# Patient Record
Sex: FEMALE | Race: WHITE | Employment: FULL TIME | URBAN - METROPOLITAN AREA
[De-identification: names, ages, dates, MRNs, and addresses within clinical notes are randomized per-mention and may not be internally consistent; named-entity substitution may affect disease eponyms.]

---

## 2021-01-16 ENCOUNTER — HOSPITAL ENCOUNTER (EMERGENCY)
Age: 21
Discharge: HOME | End: 2021-01-16
Attending: EMERGENCY MEDICINE
Payer: COMMERCIAL

## 2021-01-16 ENCOUNTER — APPOINTMENT (OUTPATIENT)
Dept: CT IMAGING | Age: 21
End: 2021-01-16
Payer: COMMERCIAL

## 2021-01-16 VITALS
TEMPERATURE: 98.1 F | HEIGHT: 65 IN | HEART RATE: 64 BPM | DIASTOLIC BLOOD PRESSURE: 62 MMHG | RESPIRATION RATE: 15 BRPM | OXYGEN SATURATION: 100 % | WEIGHT: 221.56 LBS | SYSTOLIC BLOOD PRESSURE: 106 MMHG | BODY MASS INDEX: 36.91 KG/M2

## 2021-01-16 DIAGNOSIS — K59.00 CONSTIPATION, UNSPECIFIED CONSTIPATION TYPE: Primary | ICD-10-CM

## 2021-01-16 DIAGNOSIS — R10.9 ABDOMINAL PAIN, UNSPECIFIED ABDOMINAL LOCATION: ICD-10-CM

## 2021-01-16 DIAGNOSIS — K62.5 RECTAL BLEEDING: ICD-10-CM

## 2021-01-16 LAB
A/G RATIO: 1.7 (ref 1.1–2.2)
ALBUMIN SERPL-MCNC: 4.5 G/DL (ref 3.4–5)
ALP BLD-CCNC: 57 U/L (ref 40–129)
ALT SERPL-CCNC: 33 U/L (ref 10–40)
ANION GAP SERPL CALCULATED.3IONS-SCNC: 11 MMOL/L (ref 3–16)
AST SERPL-CCNC: 25 U/L (ref 15–37)
BACTERIA: ABNORMAL /HPF
BASOPHILS ABSOLUTE: 0 K/UL (ref 0–0.2)
BASOPHILS RELATIVE PERCENT: 0.5 %
BILIRUB SERPL-MCNC: 0.4 MG/DL (ref 0–1)
BILIRUBIN URINE: NEGATIVE
BLOOD, URINE: ABNORMAL
BUN BLDV-MCNC: 10 MG/DL (ref 7–20)
CALCIUM SERPL-MCNC: 9.6 MG/DL (ref 8.3–10.6)
CHLORIDE BLD-SCNC: 101 MMOL/L (ref 99–110)
CLARITY: CLEAR
CO2: 24 MMOL/L (ref 21–32)
COLOR: YELLOW
CREAT SERPL-MCNC: 0.7 MG/DL (ref 0.6–1.1)
EOSINOPHILS ABSOLUTE: 0.1 K/UL (ref 0–0.6)
EOSINOPHILS RELATIVE PERCENT: 1.2 %
EPITHELIAL CELLS, UA: 1 /HPF (ref 0–5)
GFR AFRICAN AMERICAN: >60
GFR NON-AFRICAN AMERICAN: >60
GLOBULIN: 2.7 G/DL
GLUCOSE BLD-MCNC: 80 MG/DL (ref 70–99)
GLUCOSE URINE: NEGATIVE MG/DL
HCG(URINE) PREGNANCY TEST: NEGATIVE
HCT VFR BLD CALC: 35.9 % (ref 36–48)
HEMOGLOBIN: 11.9 G/DL (ref 12–16)
HYALINE CASTS: 1 /LPF (ref 0–8)
KETONES, URINE: NEGATIVE MG/DL
LEUKOCYTE ESTERASE, URINE: NEGATIVE
LYMPHOCYTES ABSOLUTE: 3.2 K/UL (ref 1–5.1)
LYMPHOCYTES RELATIVE PERCENT: 33.1 %
MCH RBC QN AUTO: 30.9 PG (ref 26–34)
MCHC RBC AUTO-ENTMCNC: 33 G/DL (ref 31–36)
MCV RBC AUTO: 93.5 FL (ref 80–100)
MICROSCOPIC EXAMINATION: YES
MONOCYTES ABSOLUTE: 0.6 K/UL (ref 0–1.3)
MONOCYTES RELATIVE PERCENT: 5.8 %
NEUTROPHILS ABSOLUTE: 5.7 K/UL (ref 1.7–7.7)
NEUTROPHILS RELATIVE PERCENT: 59.4 %
NITRITE, URINE: NEGATIVE
PDW BLD-RTO: 13 % (ref 12.4–15.4)
PH UA: 6.5 (ref 5–8)
PLATELET # BLD: 343 K/UL (ref 135–450)
PMV BLD AUTO: 8.3 FL (ref 5–10.5)
POTASSIUM SERPL-SCNC: 3.7 MMOL/L (ref 3.5–5.1)
PROTEIN UA: NEGATIVE MG/DL
RBC # BLD: 3.84 M/UL (ref 4–5.2)
RBC UA: 2 /HPF (ref 0–4)
SODIUM BLD-SCNC: 136 MMOL/L (ref 136–145)
SPECIFIC GRAVITY UA: 1.01 (ref 1–1.03)
TOTAL PROTEIN: 7.2 G/DL (ref 6.4–8.2)
URINE REFLEX TO CULTURE: ABNORMAL
URINE TYPE: ABNORMAL
UROBILINOGEN, URINE: 0.2 E.U./DL
WBC # BLD: 9.6 K/UL (ref 4–11)
WBC UA: 1 /HPF (ref 0–5)

## 2021-01-16 PROCEDURE — 84703 CHORIONIC GONADOTROPIN ASSAY: CPT

## 2021-01-16 PROCEDURE — 80053 COMPREHEN METABOLIC PANEL: CPT

## 2021-01-16 PROCEDURE — 99285 EMERGENCY DEPT VISIT HI MDM: CPT

## 2021-01-16 PROCEDURE — 6360000004 HC RX CONTRAST MEDICATION: Performed by: EMERGENCY MEDICINE

## 2021-01-16 PROCEDURE — 85025 COMPLETE CBC W/AUTO DIFF WBC: CPT

## 2021-01-16 PROCEDURE — 81001 URINALYSIS AUTO W/SCOPE: CPT

## 2021-01-16 PROCEDURE — 74177 CT ABD & PELVIS W/CONTRAST: CPT

## 2021-01-16 RX ORDER — DOCUSATE SODIUM 100 MG/1
100 CAPSULE, LIQUID FILLED ORAL 2 TIMES DAILY PRN
Qty: 20 CAPSULE | Refills: 0 | Status: SHIPPED | OUTPATIENT
Start: 2021-01-16

## 2021-01-16 RX ADMIN — IOPAMIDOL 75 ML: 755 INJECTION, SOLUTION INTRAVENOUS at 09:57

## 2021-01-16 ASSESSMENT — PAIN DESCRIPTION - FREQUENCY: FREQUENCY: CONTINUOUS

## 2021-01-16 ASSESSMENT — PAIN DESCRIPTION - DESCRIPTORS: DESCRIPTORS: ACHING

## 2021-01-16 ASSESSMENT — PAIN DESCRIPTION - ONSET: ONSET: ON-GOING

## 2021-01-16 ASSESSMENT — PAIN DESCRIPTION - ORIENTATION: ORIENTATION: MID;LOWER

## 2021-01-16 ASSESSMENT — PAIN - FUNCTIONAL ASSESSMENT: PAIN_FUNCTIONAL_ASSESSMENT: PREVENTS OR INTERFERES SOME ACTIVE ACTIVITIES AND ADLS

## 2021-01-16 ASSESSMENT — PAIN DESCRIPTION - LOCATION: LOCATION: ABDOMEN

## 2021-01-16 ASSESSMENT — PAIN SCALES - GENERAL: PAINLEVEL_OUTOF10: 2

## 2021-01-16 NOTE — ED NOTES
Discharge and education instructions reviewed. Patient verbalized understanding, teach-back successful. Patient denied questions at this time. No acute distress noted. Patient instructed to follow-up as noted - return to emergency department if symptoms worsen. Patient verbalized understanding. Discharged per EDMD with discharge instructions.         Sunil Alfonso RN  01/16/21 1126

## 2021-01-16 NOTE — ED NOTES
Pt to ED with c/o mid/lower abd pain, nausea, constipation and rectal bleeding. States last bowel movement was 8 days ago. States the bleeding is when she strains, bright red in color. Pt alert and oriented x4, VSS.       Felicita Jackson RN  01/16/21 0658

## 2021-01-16 NOTE — ED PROVIDER NOTES
Attending Supervisory Note/Shared Visit   I have personally performed a face to face diagnostic evaluation on this patient. I have reviewed the mid-levels findings and agree. History and Exam by me shows alert white female in no acute distress. She has not a bowel movement for a week. She has some intermittent lower abdominal discomfort. She has no rectal pressure or pain. She did see some blood in the toilet on one occasion in the last week. Heart: Regular rate and rhythm. No murmurs or gallops noted. Lungs: Breath sounds equal bilaterally and clear. Abdomen: Obese, soft, nontender. No masses organomegaly. Bowel sounds are normal.    Lab reviewed. H&H are 11.9 and 35.9. White blood cell count 9600 with a normal differential.  Electrolytes BUN and creatinine are normal.  Liver enzymes are normal.  Urinalysis unremarkable. Pregnancy test is negative. CT abdomen pelvis: Mild retained stool in the right colon. There is mild distention and fecalization of the contents in the terminal ileum. No adjacent inflammatory changes. Otherwise unremarkable CT of the abdomen pelvis. No evidence of appendicitis. Patient had no bowel movement for a week. She has some mild lower abdominal discomfort. She has a benign abdominal exam.  She is afebrile with normal vital signs. She has no stool in her rectal vault. She has no blood in the rectal vault. She has no obvious external hemorrhoids, fissures, or source for her bleeding. We will have her continue the MiraLAX. We will add Colace. We will give her a GI referral.  She understands that with the rectal bleeding without an obvious source she will likely need a colonoscopy as determined by gastroenterology. Return for worsening of symptoms or new symptoms of concern. Test results, diagnosis, and treatment plan were discussed with the patient. She understands the treatment plan and follow-up as discussed.       (Please note that portions of this note were completed with a voice recognition program.  Efforts were made to edit the dictations but occasionally words are mis-transcribed.)    Vladimir Mendoza MD  Attending Emergency Physician        Ramos Shrestha MD  01/16/21 4824

## 2021-01-16 NOTE — ED PROVIDER NOTES
[address] [facility]        Pt Name: Ilan Nava  MRN: [id number]  [place] 2000  Date of evaluation: 1/16/2021  Provider: JANIE Crespo    This patient was seen and evaluated by the attending physician Edilberto Bryan MD.      [address] [place] [address]       Chief Complaint   Patient presents with    Abdominal Pain     Pt to ED with c/o mid/lower abd pain, nausea, constipation and rectal bleeding. States last bowel movement was 8 days ago. States the bleeding is when she strains, bright red in color.  Constipation         HISTORY OFPRESENT ILLNESS  (Location/Symptom, Timing/Onset, Context/Setting, Quality, Duration, Modifying Factors, Severity.)   Ilan Nava is a 24 y.o. female who presents to the emergency department for abdominal pain, constipation, rectal bleeding. Patient reports suprapubic abdominal pain that radiates to lower back for the past week. Is been intermittent with no alleviating factors. Reports he feels \"uncomfortable\". Pain is a 4/10. She reports constipation for the past week. Has not passed any stool in 1 week. She is still passing gas. Has been doing MiraLAX twice daily for the past 3 days. Does report a history of constipation as a younger child. Also reports 3 episodes of rectal bleeding over the past week. Reports he was only straining a little bit when they occurred. She denies aspirin or anticoagulant use. Denies rectal pain. Denies prior episodes of this bleeding. She reports nausea but denies vomiting. Denies fever chills chest pain shortness of breath. Does not know when her last menstrual cycle was. Does report her mother has a history of a bowel problem but she is not sure if it is inflammatory bowel disease or irritable bowel disease or any of the details. Nursing Notes were reviewed and I agree.     REVIEW OF SYSTEMS    (2-9 systems for level 4, 10 or more for level 5) Review of Systems   Constitutional: Negative for chills and fever. Respiratory: Negative for shortness of breath. Cardiovascular: Negative for chest pain. Gastrointestinal: Positive for abdominal pain, anal bleeding, constipation and nausea. Negative for vomiting. Except as noted above the remainder ofthe review of systems was reviewed and negative. PAST MEDICALHISTORY   History reviewed. No pertinent past medical history. SURGICAL HISTORY     History reviewed. No pertinent surgical history. CURRENT MEDICATIONS       Discharge Medication List as of 1/16/2021 11:23 AM          ALLERGIES     Patient has no known allergies. FAMILY HISTORY     History reviewed. No pertinent family history. No family status information on file. SOCIAL HISTORY      reports that she has been smoking cigarettes. She has been smoking about 1.00 pack per day. She has never used smokeless tobacco. She reports previous alcohol use. She reports previous drug use. EXAM    (up to 7 for level 4, 8 or more for level 5)     ED Triage Vitals [01/16/21 0830]   BP Temp Temp Source Pulse Resp SpO2 Height Weight   120/81 98.4 °F (36.9 °C) Oral 84 16 100 % 5' 5\" (1.651 m) 221 lb 9 oz (100.5 kg)       Physical Exam  Constitutional:       General: She is not in acute distress. Appearance: Normal appearance. She is not ill-appearing, toxic-appearing or diaphoretic. HENT:      Head: Normocephalic and atraumatic. Nose: Nose normal.   Neck:      Musculoskeletal: Normal range of motion and neck supple. Cardiovascular:      Rate and Rhythm: Normal rate and regular rhythm. Heart sounds: Normal heart sounds. Pulmonary:      Effort: Pulmonary effort is normal. No respiratory distress. Breath sounds: Normal breath sounds. Abdominal:      General: Bowel sounds are normal. There is no distension. Palpations: Abdomen is soft. There is no mass. Tenderness:  There is abdominal tenderness (very minimal TTP suprapubic abdomen). There is no guarding or rebound. Hernia: No hernia is present. Genitourinary:     Comments: PEDRO performed. No hemorrhoids or fissures. No stool or mucous in rectal vault. Cortes VENTURA chaperoned  Musculoskeletal: Normal range of motion. Skin:     General: Skin is warm. Neurological:      Mental Status: She is alert. Psychiatric:         Mood and Affect: Mood normal.         Behavior: Behavior normal.         Thought Content: Thought content normal.         Judgment: Judgment normal.         DIFFERENTIAL DIAGNOSIS   Appendicitis, Small Bowel Obstruction, Pancreatitis, Cholesystitis, Hepatitis, Aortic Dissection, DKA, Pylonephritis, Kidney Stone, Diverticulitis, Ectopic Pregnancy, PID, Ovarian Torsion, Ovarian cyst, TOA,UTI, STD    DIAGNOSTIC RESULTS         RADIOLOGY:   Non-plain film images such as CT, Ultrasound and MRI are read by the radiologist. Roland Seek images are visualized and preliminarily interpreted by JANIE Marquez with the below findings:      Interpretation per the Radiologistbelow, if available at the time of this note:    CT ABDOMEN PELVIS W IV CONTRAST Additional Contrast? None   Final Result   1. Mild retained stool in the right colon. There is mild distension and   fecalization of contents in the terminal ileum. No adjacent inflammatory   changes. 2. Otherwise unremarkable CT of the abdomen and pelvis. No evidence of   appendicitis.                LABS:  Results for orders placed or performed during the hospital encounter of 01/16/21   CBC Auto Differential   Result Value Ref Range    WBC 9.6 4.0 - 11.0 K/uL    RBC 3.84 (L) 4.00 - 5.20 M/uL    Hemoglobin 11.9 (L) 12.0 - 16.0 g/dL    Hematocrit 35.9 (L) 36.0 - 48.0 %    MCV 93.5 80.0 - 100.0 fL    MCH 30.9 26.0 - 34.0 pg    MCHC 33.0 31.0 - 36.0 g/dL    RDW 13.0 12.4 - 15.4 %    Platelets 928 453 - 755 K/uL    MPV 8.3 5.0 - 10.5 fL    Neutrophils % 59.4 %    Lymphocytes % 33.1 dictation. EMERGENCY DEPARTMENT COURSE and DIFFERENTIAL DIAGNOSIS/MDM:   Vitals:    Vitals:    01/16/21 1020 01/16/21 1032 01/16/21 1102 01/16/21 1117   BP: 111/72 111/63 107/67 106/62   Pulse: 60 55 60 64   Resp: 14 14 18 15   Temp:    98.1 °F (36.7 °C)   TempSrc:    Oral   SpO2: 100% 100% 100% 100%   Weight:       Height:           Patient was nontoxic, well appearing, afebrile with normal vital signs. Patient was saturating well on room air. See CMP normal.  Pregnancy negative. Urinalysis negative. CT abdomen pelvis with mild retained stool in the right colon. Mild distention and equalization of contents of the terminal ileum. No adjacent inflammatory changes. Otherwise unremarkable CT of the abdomen and pelvis. No evidence appendicitis. On reevaluation patient clinically appears well. Discussed with her the findings. Discussed that she needs to follow-up with GI and may need a colonoscopy. She understands this. Discussed to continue the MiraLAX twice daily for the constipation and will add Colace. Return for worsening. Agreed understood. I estimate there is LOW risk for ACUTE APPENDICITIS, BOWELOBSTRUCTION, CHOLECYSTITIS, DIVERTICULITIS, INCARCERATED HERNIA, PANCREATITIS, PELVIC INFLAMMATORY DISEASE, PERFORATED BOWEL or ULCER, ECTOPIC PREGNANCY, or TUBO-OVARIAN ABSCESS, thus I consider the discharge dispositionreasonable. Also, there is no evidence or peritonitis, sepsis, or toxicity. CONSULTS:  None    PROCEDURES:  None    FINAL IMPRESSION      1. Constipation, unspecified constipation type    2. Abdominal pain, unspecified abdominal location    3.  Rectal bleeding          DISPOSITION/PLAN   DISPOSITION Decision To Discharge 01/16/2021 11:11:59 AM      PATIENT REFERRED TO:  MD Yaron Staples [address] [facility] [address]    Schedule an appointment as soon as possible for a visit in 2 days  for reevaluation    [facility] Emergency Department  [address] [facility]  [phone]    As needed, If symptoms worsen      DISCHARGE MEDICATIONS:  Discharge Medication List as of 1/16/2021 11:23 AM      START taking these medications    Details   docusate sodium (COLACE) 100 MG capsule Take 1 capsule by mouth 2 times daily as needed for Constipation, Disp-20 capsule, R-0Print             (Please note that portions of this note were completed with a voice recognition program.  Efforts were made to edit the dictations but occasionally words are mis-transcribed.)    Meghan German, [address] [place] [address]  01/18/21 3400

## 2021-01-18 ASSESSMENT — ENCOUNTER SYMPTOMS
CONSTIPATION: 1
ABDOMINAL PAIN: 1
ANAL BLEEDING: 1
SHORTNESS OF BREATH: 0
NAUSEA: 1
VOMITING: 0